# Patient Record
Sex: MALE | Race: WHITE | NOT HISPANIC OR LATINO | URBAN - METROPOLITAN AREA
[De-identification: names, ages, dates, MRNs, and addresses within clinical notes are randomized per-mention and may not be internally consistent; named-entity substitution may affect disease eponyms.]

---

## 2023-09-19 ENCOUNTER — WEB ENCOUNTER (OUTPATIENT)
Dept: URBAN - METROPOLITAN AREA CLINIC 92 | Facility: CLINIC | Age: 23
End: 2023-09-19

## 2023-09-19 ENCOUNTER — OFFICE VISIT (OUTPATIENT)
Dept: URBAN - METROPOLITAN AREA CLINIC 92 | Facility: CLINIC | Age: 23
End: 2023-09-19
Payer: COMMERCIAL

## 2023-09-19 VITALS
TEMPERATURE: 97.2 F | HEIGHT: 61 IN | HEART RATE: 95 BPM | WEIGHT: 153 LBS | BODY MASS INDEX: 28.89 KG/M2 | SYSTOLIC BLOOD PRESSURE: 119 MMHG | DIASTOLIC BLOOD PRESSURE: 71 MMHG

## 2023-09-19 DIAGNOSIS — K50.90 CROHN'S DISEASE WITHOUT COMPLICATION, UNSPECIFIED GASTROINTESTINAL TRACT LOCATION: ICD-10-CM

## 2023-09-19 DIAGNOSIS — E61.1 IRON DEFICIENCY: ICD-10-CM

## 2023-09-19 PROBLEM — 34000006: Status: ACTIVE | Noted: 2023-09-19

## 2023-09-19 PROCEDURE — 99204 OFFICE O/P NEW MOD 45 MIN: CPT

## 2023-09-19 NOTE — HPI-TODAY'S VISIT:
Patient is 23 year old male with a PMH of Crohn's disease.      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 Per GI note from [place], Patient was diagnosed by capsule endoscopy with Crohn's disease, no confirmatory pathology. Did not have routine access to enteroscopy. Patient with long standing, refractory iron deficiency anemia requiring regular IV iron infusions since 2016. Initial symptoms prior to diagnosis include abdominal pain which improved with biweekly adalimumab subQ injections. Pill cam done on April 2021 revealed discrete area of small bowel ulceration and inflammation. Patient had multiple previous normal investigations including colonoscopy (10/06/20), EGD, MRE (2019 and 2022), and Meckle's scan (June 2020). Persistent moderately raised fecal calprotectin since 2017. Had no improvement with budesonide. Patient has an elevated TPMT (179). Previous low IgG and low IgA. Recommended by his physician in [place] prior to coming to the [facility] to increase adalimumab 40mg Sub Q to weekly and continue IV iron infusions every 4 weeks.      Colonoscopy on 10/06/20 revealed no obvious inflammation in small bowel, erythematous nodules, large bowel entirely normal in appearance, bx revealed no obvious inflammation in small bowel and large bowel entirely normal in appearance.     MRI small bowel with contrast 05/03/22 revealed no small bowel pathology identified. Slight increase in mesenteric lymph nodes, of indeterminate significance, possible reactive     Today, patient notes of occasional abdominal pain which is food related, no pain currently. Denies constipation, diarrhea, hematochezia, melena, or weight loss. Denies nausea, vomiting, or reflux.  Patient's main concern is to set up his iron infusions and obtain refills for adalimumab.      Negative Quantiferon Gold done a month ago. Due for second hepatitis B vaccine in [facility]. Last had a flu vaccine a year ago.      Most recent labs: (2/02/23) Fecal christian protectin 248, (5/04/23) adalimumab 11.9, ESR 2

## 2023-09-20 LAB
A/G RATIO: 2.6
ABSOLUTE BASOPHILS: 41
ABSOLUTE EOSINOPHILS: 83
ABSOLUTE LYMPHOCYTES: 1274
ABSOLUTE MONOCYTES: 419
ABSOLUTE NEUTROPHILS: 4083
ALBUMIN: 4.7
ALKALINE PHOSPHATASE: 63
ALT (SGPT): 30
AST (SGOT): 21
BASOPHILS: 0.7
BILIRUBIN, TOTAL: 0.9
BUN/CREATININE RATIO: (no result)
BUN: 13
C-REACTIVE PROTEIN, QUANT: 0.2
CALCIUM: 9.6
CARBON DIOXIDE, TOTAL: 29
CHLORIDE: 103
CREATININE: 0.84
EGFR: 127
EOSINOPHILS: 1.4
FERRITIN, SERUM: 7
GLOBULIN, TOTAL: 1.8
GLUCOSE: 79
HEMATOCRIT: 34.1
HEMOGLOBIN: 10.2
IRON BIND.CAP.(TIBC): 389
IRON SATURATION: 3
IRON: 11
LYMPHOCYTES: 21.6
MCH: 22.4
MCHC: 29.9
MCV: 74.8
MONOCYTES: 7.1
MPV: 10.7
NEUTROPHILS: 69.2
PLATELET COUNT: 467
POTASSIUM: 3.9
PROTEIN, TOTAL: 6.5
RDW: 19.6
RED BLOOD CELL COUNT: 4.56
SODIUM: 141
VITAMIN D,25-OH,TOTAL,IA: 29
WHITE BLOOD CELL COUNT: 5.9

## 2023-09-21 ENCOUNTER — TELEPHONE ENCOUNTER (OUTPATIENT)
Dept: URBAN - METROPOLITAN AREA CLINIC 92 | Facility: CLINIC | Age: 23
End: 2023-09-21

## 2023-09-21 RX ORDER — IRON SUCROSE 20 MG/ML
AS DIRECTED INJECTION, SOLUTION INTRAVENOUS
Qty: 5 | Refills: 0 | OUTPATIENT
Start: 2023-09-21 | End: 2023-10-05

## 2023-09-27 LAB — CALPROTECTIN, FECAL: 192

## 2023-09-29 ENCOUNTER — TELEPHONE ENCOUNTER (OUTPATIENT)
Dept: URBAN - METROPOLITAN AREA CLINIC 92 | Facility: CLINIC | Age: 23
End: 2023-09-29

## 2023-10-12 ENCOUNTER — OFFICE VISIT (OUTPATIENT)
Dept: URBAN - METROPOLITAN AREA CLINIC 92 | Facility: CLINIC | Age: 23
End: 2023-10-12
Payer: COMMERCIAL

## 2023-10-12 ENCOUNTER — TELEPHONE ENCOUNTER (OUTPATIENT)
Dept: URBAN - METROPOLITAN AREA CLINIC 92 | Facility: CLINIC | Age: 23
End: 2023-10-12

## 2023-10-12 VITALS
DIASTOLIC BLOOD PRESSURE: 78 MMHG | BODY MASS INDEX: 29.45 KG/M2 | SYSTOLIC BLOOD PRESSURE: 122 MMHG | WEIGHT: 156 LBS | HEIGHT: 61 IN | HEART RATE: 93 BPM | TEMPERATURE: 96.8 F

## 2023-10-12 DIAGNOSIS — K50.90 CROHN'S DISEASE WITHOUT COMPLICATION, UNSPECIFIED GASTROINTESTINAL TRACT LOCATION: ICD-10-CM

## 2023-10-12 DIAGNOSIS — D50.9 IRON DEFICIENCY ANEMIA, UNSPECIFIED IRON DEFICIENCY ANEMIA TYPE: ICD-10-CM

## 2023-10-12 PROBLEM — 87522002: Status: ACTIVE | Noted: 2023-10-12

## 2023-10-12 PROCEDURE — 99214 OFFICE O/P EST MOD 30 MIN: CPT

## 2023-10-12 RX ORDER — FERRIC CARBOXYMALTOSE INJECTION 50 MG/ML
AS DIRECTED INJECTION, SOLUTION INTRAVENOUS
OUTPATIENT
Start: 2023-10-12 | End: 2023-10-26

## 2023-10-12 NOTE — HPI-TODAY'S VISIT:
Patient is 23 year old male with a PMH of Crohn's disease.      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 Per GI note from [place], Patient was diagnosed by capsule endoscopy with Crohn's disease, no confirmatory pathology. Did not have routine access to enteroscopy. Patient with long standing, refractory iron deficiency anemia requiring regular IV iron infusions since 2016. Initial symptoms prior to diagnosis include abdominal pain which improved with biweekly adalimumab subQ injections. Pill cam done on April 2021 revealed discrete area of small bowel ulceration and inflammation. Patient had multiple previous normal investigations including colonoscopy (10/06/20), EGD, MRE (2019 and 2022), and Meckle's scan (June 2020). Persistent moderately raised fecal calprotectin since 2017. Had no improvement with budesonide. Patient has an elevated TPMT (179). Previous low IgG and low IgA. Recommended by his physician in [place] prior to coming to the [facility] to increase adalimumab 40mg Sub Q to weekly and continue IV iron infusions every 4 weeks.      Colonoscopy on 10/06/20 revealed no obvious inflammation in small bowel, erythematous nodules, large bowel entirely normal in appearance, bx revealed no obvious inflammation in small bowel and large bowel entirely normal in appearance.     MRI small bowel with contrast 05/03/22 revealed no small bowel pathology identified. Slight increase in mesenteric lymph nodes, of indeterminate significance, possibly reactive     Negative Quantiferon Gold done a month ago. Due for second hepatitis B vaccine in December. Last had a flu vaccine a year ago.      Labs: (2/02/23) Fecal christian protectin 248, (5/04/23) adalimumab 11.9, ESR 2    Today, patient reports he developed abdominal pains for a two week period which has resolved. Patient had one episode of vomiting with minimal blood after eating. Venofer iron infusion order sent to [place] but copay was $200 so patient decided to take iron supplements instead. Patient has noticed a difference being off of his iron infusions as he occasionally feels short of breath and dizzy especially during strenous activity. Denies diarrhea, constipation, hematochezia, or melena.     Labs from 9/20/23 - Total iron 11, TIBC 389, Sat % 3, ferritin 7, vitamin D 29, Fecal christian 192, Hgb 10.2.

## 2023-10-23 ENCOUNTER — TELEPHONE ENCOUNTER (OUTPATIENT)
Dept: URBAN - METROPOLITAN AREA CLINIC 97 | Facility: CLINIC | Age: 23
End: 2023-10-23

## 2024-01-11 ENCOUNTER — TELEPHONE ENCOUNTER (OUTPATIENT)
Dept: URBAN - METROPOLITAN AREA CLINIC 92 | Facility: CLINIC | Age: 24
End: 2024-01-11

## 2024-01-12 ENCOUNTER — OFFICE VISIT (OUTPATIENT)
Dept: URBAN - METROPOLITAN AREA CLINIC 92 | Facility: CLINIC | Age: 24
End: 2024-01-12
Payer: COMMERCIAL

## 2024-01-12 VITALS
SYSTOLIC BLOOD PRESSURE: 134 MMHG | WEIGHT: 154 LBS | HEIGHT: 61 IN | HEART RATE: 101 BPM | BODY MASS INDEX: 29.07 KG/M2 | DIASTOLIC BLOOD PRESSURE: 77 MMHG | TEMPERATURE: 96.9 F

## 2024-01-12 DIAGNOSIS — K50.90 CROHN'S DISEASE WITHOUT COMPLICATION, UNSPECIFIED GASTROINTESTINAL TRACT LOCATION: ICD-10-CM

## 2024-01-12 DIAGNOSIS — D50.9 IRON DEFICIENCY ANEMIA, UNSPECIFIED IRON DEFICIENCY ANEMIA TYPE: ICD-10-CM

## 2024-01-12 PROCEDURE — 99214 OFFICE O/P EST MOD 30 MIN: CPT

## 2024-01-12 NOTE — HPI-TODAY'S VISIT:
Patient is 23 year old male with a PMH of Crohn's disease who presents in follow up.       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 Per GI note from [place], Patient was diagnosed by capsule endoscopy with Crohn's disease, no confirmatory pathology. Did not have routine access to enteroscopy. Patient with long standing, refractory iron deficiency anemia requiring regular IV iron infusions since 2016. Initial symptoms prior to diagnosis include abdominal pain which improved with biweekly adalimumab subQ injections. Pill cam done on April 2021 revealed discrete area of small bowel ulceration and inflammation. Patient had multiple previous normal investigations including colonoscopy (10/06/20), EGD, MRE (2019 and 2022), and Meckle's scan (June 2020). Persistent moderately raised fecal calprotectin since 2017. Had no improvement with budesonide. Patient has an elevated TPMT (179). Previous low IgG and low IgA. Recommended by his physician in [place] prior to coming to the [facility] to increase adalimumab 40mg Sub Q to weekly and continue IV iron infusions every 4 weeks.   Colonoscopy on 10/06/20 revealed no obvious inflammation in small bowel, erythematous nodules, large bowel entirely normal in appearance, bx revealed no obvious inflammation in small bowel and large bowel entirely normal in appearance. MRI small bowel with contrast 05/03/22 revealed no small bowel pathology identified. Slight increase in mesenteric lymph nodes, of indeterminate significance, possibly reactive    Negative Quantiferon Gold done August 2023. Due for second hepatitis B vaccine in December 2023. Last had a flu vaccine in 2022.  Labs: (2/02/23) Fecal christian protectin 248, (5/04/23) adalimumab 11.9, ESR 2  Patient was first seen in our office on 9/19/23. Labs from 9/19/23 - Total iron 11, TIBC 389, Sat % 3, ferritin 7, vitamin D 29, Fecal christian 192, Hgb 10.2. Iron infusion order was placed at that time but at his follow up visit in October patient had still not received an infusion. Copay for Venofer was $200 so patient decided to take iron supplements. He preferred to receive infusions at our office but Injectafer was denied. Patient was referred again to [facility] to receive Venofer IV, 200mg weekly x 5 weeks. First dose at the beginning of November, Last dose 12/01/23.  Labs 01/11/24  - Hgb 6.8, Hct 25.6. MCV 59.4, , iron studies pending  Currently, patient feels tired, slight dizziness, and shortness of breath when he walks. In no acute distress. Notes he developed leg pain as he walked to the office. Reports of minimal abdominal discomfort yesterday. Denies diarrhea, constipation, and even hematochezia. He has a daily BM. Denies fever, chills, night sweats. Normally, when he sits up he becomes dizzy and vision goes dark for a few seconds. More recently he noticed dizziness even while lying down. Patient reports 6 years ago he was on oral iron supplements which were of benefit but caused significant abdominal cramping, black stools, and constipation leading to a decreased appetite.   Last dose of adalimumab on 11/17/23. Patient states his medication was stored at the [facility] because he did not have a fridge but has now moved into a different apartment with a fridge.

## 2024-01-17 ENCOUNTER — TELEPHONE ENCOUNTER (OUTPATIENT)
Dept: URBAN - METROPOLITAN AREA CLINIC 92 | Facility: CLINIC | Age: 24
End: 2024-01-17

## 2024-02-06 ENCOUNTER — OV EP (OUTPATIENT)
Dept: URBAN - METROPOLITAN AREA CLINIC 92 | Facility: CLINIC | Age: 24
End: 2024-02-06
Payer: COMMERCIAL

## 2024-02-06 VITALS
BODY MASS INDEX: 29.27 KG/M2 | HEIGHT: 61 IN | WEIGHT: 155 LBS | SYSTOLIC BLOOD PRESSURE: 121 MMHG | HEART RATE: 85 BPM | TEMPERATURE: 96.2 F | DIASTOLIC BLOOD PRESSURE: 79 MMHG

## 2024-02-06 DIAGNOSIS — D50.9 IRON DEFICIENCY ANEMIA, UNSPECIFIED IRON DEFICIENCY ANEMIA TYPE: ICD-10-CM

## 2024-02-06 DIAGNOSIS — K50.90 CROHN'S DISEASE WITHOUT COMPLICATION, UNSPECIFIED GASTROINTESTINAL TRACT LOCATION: ICD-10-CM

## 2024-02-06 PROCEDURE — 99213 OFFICE O/P EST LOW 20 MIN: CPT

## 2024-02-06 NOTE — HPI-TODAY'S VISIT:
Patient is 23 year old male with a PMH of Crohn's disease who presents in follow up.       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 Per GI note from [place], Patient was diagnosed by capsule endoscopy with Crohn's disease, no confirmatory pathology. Did not have routine access to enteroscopy. Patient with long standing, refractory iron deficiency anemia requiring regular IV iron infusions since 2016. Initial symptoms prior to diagnosis include abdominal pain which improved with biweekly adalimumab subQ injections. Pill cam done on April 2021 revealed discrete area of small bowel ulceration and inflammation. Patient had multiple previous normal investigations including colonoscopy (10/06/20), EGD, MRE (2019 and 2022), and Meckle's scan (June 2020). Persistent moderately raised fecal calprotectin since 2017. Had no improvement with budesonide. Patient has an elevated TPMT (179). Previous low IgG and low IgA. Recommended by his physician in [place] prior to coming to the [facility] to increase adalimumab 40mg Sub Q to weekly and continue IV iron infusions every 4 weeks.   Colonoscopy on 10/06/20 revealed no obvious inflammation in small bowel, erythematous nodules, large bowel entirely normal in appearance, bx revealed no obvious inflammation in small bowel and large bowel entirely normal in appearance. MRI small bowel with contrast 05/03/22 revealed no small bowel pathology identified. Slight increase in mesenteric lymph nodes, of indeterminate significance, possibly reactive    Negative Quantiferon Gold done August 2023. Due for second hepatitis B vaccine in December 2023. Last had a flu vaccine in 2022.  Labs: (2/02/23) Fecal christian protectin 248, (5/04/23) adalimumab 11.9, ESR 2  Patient was first seen in our office on 9/19/23. Labs from 9/19/23 - Total iron 11, TIBC 389, Sat % 3, ferritin 7, vitamin D 29, Fecal christian 192, Hgb 10.2. Iron infusion order was placed at that time but at his follow up visit in October patient had still not received an infusion. Copay for Venofer was $200 so patient decided to take iron supplements. He preferred to receive infusions at our office but Injectafer was denied. Patient was referred again to [facility] to receive Venofer IV, 200mg weekly x 5 weeks. First dose at the beginning of November, Last dose 12/01/23.  Labs 01/11/24  - Hgb 6.8, Hct 25.6. MCV 59.4, , iron studies pending  1/12/24, patient felt tired, slight dizziness, and shortness of breath with walking. In no acute distress. Noted he developed leg pain as he walked to the office. Reported of minimal abdominal discomfort yesterday. Denied diarrhea, constipation, and even hematochezia. Had daily BM. Denied fever, chills, night sweats. Normally, when he sat up he became dizzy and vision goes dark for a few seconds. More recently he noticed dizziness even while lying down. Patient reported 6 years ago he was on oral iron supplements which were of benefit but caused significant abdominal cramping, black stools, and constipation leading to a decreased appetite.   Today, patient reports he will receive his third dose of Injectafer this Thursday at Dr. Patel's office. He does feel better and has no GI complaints. His fatigue improved. He is going to have repeat labs at the [facility]. Patient had stopped adalimumab since 11/17/23. Has restarted his weekly dose after his last OV.

## 2024-04-02 ENCOUNTER — OV EP (OUTPATIENT)
Dept: URBAN - METROPOLITAN AREA CLINIC 92 | Facility: CLINIC | Age: 24
End: 2024-04-02
Payer: COMMERCIAL

## 2024-04-02 VITALS
BODY MASS INDEX: 29.87 KG/M2 | HEIGHT: 61 IN | WEIGHT: 158.2 LBS | HEART RATE: 83 BPM | TEMPERATURE: 97.2 F | SYSTOLIC BLOOD PRESSURE: 133 MMHG | DIASTOLIC BLOOD PRESSURE: 81 MMHG

## 2024-04-02 DIAGNOSIS — K50.90 CROHN'S DISEASE WITHOUT COMPLICATION, UNSPECIFIED GASTROINTESTINAL TRACT LOCATION: ICD-10-CM

## 2024-04-02 DIAGNOSIS — D50.9 IRON DEFICIENCY ANEMIA, UNSPECIFIED IRON DEFICIENCY ANEMIA TYPE: ICD-10-CM

## 2024-04-02 PROCEDURE — 99213 OFFICE O/P EST LOW 20 MIN: CPT

## 2025-02-19 NOTE — PHYSICAL EXAM CHEST:
Breathing is unlabored without accessory muscle use,normal breath sounds PATIENT CALLED AND WOULD LIKE TO HOLD OFF ON SURGERY TILL SHILA   Attending Attestation (For Attendings USE Only)...